# Patient Record
Sex: FEMALE | Race: WHITE | NOT HISPANIC OR LATINO | URBAN - METROPOLITAN AREA
[De-identification: names, ages, dates, MRNs, and addresses within clinical notes are randomized per-mention and may not be internally consistent; named-entity substitution may affect disease eponyms.]

---

## 2017-11-28 ENCOUNTER — IMPORTED ENCOUNTER (OUTPATIENT)
Dept: URBAN - METROPOLITAN AREA CLINIC 31 | Facility: CLINIC | Age: 72
End: 2017-11-28

## 2017-11-28 PROBLEM — H35.341: Noted: 2017-11-28

## 2017-11-28 PROBLEM — Z96.1: Noted: 2017-11-28

## 2017-11-28 PROBLEM — H43.811: Noted: 2017-11-28

## 2017-11-28 PROCEDURE — 92015 DETERMINE REFRACTIVE STATE: CPT

## 2017-11-28 PROCEDURE — 92014 COMPRE OPH EXAM EST PT 1/>: CPT

## 2017-11-28 NOTE — PATIENT DISCUSSION
1.  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 .   Patient was cautioned to call our office immediately if they experience a substantial change in their symptoms such as an increase in floaters persistent flashes loss of visual field 2. Pseudophakia OU - IOLs stable. Monitor. OD capsule open OS clear. 3.  No Rx change. 4.  Macular Hole OD -stable. sx 2/13 Patel5. RTN  CE Oct 2018. Sees DR Jackie Rosenthal in January yearly.

## 2019-01-03 ENCOUNTER — IMPORTED ENCOUNTER (OUTPATIENT)
Dept: URBAN - METROPOLITAN AREA CLINIC 31 | Facility: CLINIC | Age: 74
End: 2019-01-03

## 2019-01-03 PROBLEM — H43.811: Noted: 2019-01-03

## 2019-01-03 PROBLEM — H35.341: Noted: 2019-01-03

## 2019-01-03 PROBLEM — Z96.1: Noted: 2019-01-03

## 2019-01-03 PROCEDURE — 92015 DETERMINE REFRACTIVE STATE: CPT

## 2019-01-03 PROCEDURE — 92014 COMPRE OPH EXAM EST PT 1/>: CPT

## 2019-01-03 NOTE — PATIENT DISCUSSION
1.  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 .   Patient was cautioned to call our office immediately if they experience a substantial change in their symptoms such as an increase in floaters persistent flashes loss of visual field 2. Pseudophakia OU - IOLs stable. Monitor. OD capsule open OS clear. 3.  No Rx change. 4.  Macular Hole OD -stable. sx 2/13 Patel5. RTN  CE 1/19. Sees DR Yaa Galo in January yearly.

## 2019-05-02 ENCOUNTER — IMPORTED ENCOUNTER (OUTPATIENT)
Dept: URBAN - METROPOLITAN AREA CLINIC 31 | Facility: CLINIC | Age: 74
End: 2019-05-02

## 2019-05-02 PROBLEM — H11.31: Noted: 2019-05-02

## 2019-05-02 PROCEDURE — 99213 OFFICE O/P EST LOW 20 MIN: CPT

## 2020-01-28 ENCOUNTER — IMPORTED ENCOUNTER (OUTPATIENT)
Dept: URBAN - METROPOLITAN AREA CLINIC 31 | Facility: CLINIC | Age: 75
End: 2020-01-28

## 2020-01-28 PROBLEM — Z96.1: Noted: 2020-01-28

## 2020-01-28 PROBLEM — H35.341: Noted: 2020-01-28

## 2020-01-28 PROBLEM — H43.811: Noted: 2020-01-28

## 2020-01-28 PROBLEM — H11.31: Noted: 2020-01-28

## 2020-01-28 PROCEDURE — 92015 DETERMINE REFRACTIVE STATE: CPT

## 2020-01-28 PROCEDURE — 92014 COMPRE OPH EXAM EST PT 1/>: CPT

## 2020-01-28 NOTE — PATIENT DISCUSSION
1.  Subconjunctival hemorrhage OD --  Disc with pt and reassured. @x per pt in past yr. The condition was discussed with patient and the patient was reassured. The patient was asked to notify his family physician should he have increased bleeding or bruising elsewhere or recurrent subconjunctival hemorrhages. 2. 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 .   Patient was cautioned to call our office immediately if they experience a substantial change in their symptoms such as an increase in floaters persistent flashes loss of visual field 3. Pseudophakia OU - IOLs stable. Monitor. OD capsule open OS clear. 4.  No Rx change. 5.  Macular Hole OD -stable. sx 2/13 Patel6. RTN  CE 1/21  Sees DR Aarti Arias in January yearly.

## 2020-01-28 NOTE — PATIENT DISCUSSION
1.  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 .   Patient was cautioned to call our office immediately if they experience a substantial change in their symptoms such as an increase in floaters persistent flashes loss of visual field 2. Pseudophakia OU - IOLs stable. Monitor. OD capsule open OS clear. 3.  No Rx change. 4.  Macular Hole OD -stable. sx 2/13 Patel5. RTN  CE 1/19. Sees DR Ronal Isidro in January yearly.

## 2021-01-28 ENCOUNTER — IMPORTED ENCOUNTER (OUTPATIENT)
Dept: URBAN - METROPOLITAN AREA CLINIC 31 | Facility: CLINIC | Age: 76
End: 2021-01-28

## 2021-01-28 PROBLEM — Z96.1: Noted: 2021-01-28

## 2021-01-28 PROBLEM — H35.341: Noted: 2021-01-28

## 2021-01-28 PROBLEM — H11.31: Noted: 2021-01-28

## 2021-01-28 PROBLEM — H43.811: Noted: 2021-01-28

## 2021-01-28 PROCEDURE — 92250 FUNDUS PHOTOGRAPHY W/I&R: CPT

## 2021-01-28 PROCEDURE — 92015 DETERMINE REFRACTIVE STATE: CPT

## 2021-01-28 PROCEDURE — 92014 COMPRE OPH EXAM EST PT 1/>: CPT

## 2021-01-28 NOTE — PATIENT DISCUSSION
1.  Subconjunctival hemorrhage OD --  Disc with pt and reassured. @x per pt in past yr. The condition was discussed with patient and the patient was reassured. The patient was asked to notify his family physician should he have increased bleeding or bruising elsewhere or recurrent subconjunctival hemorrhages. 2. 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 .   Patient was cautioned to call our office immediately if they experience a substantial change in their symptoms such as an increase in floaters persistent flashes loss of visual field 3. Pseudophakia OU - IOLs stable. Monitor. OD capsule open OS clear. 4.  No Rx change. 5.  Macular Hole OD -stable. sx 2/13 Patel6. RTN  CE 1/22 Sees DR Jacquelyn Larkin in January yearly.

## 2022-01-27 ENCOUNTER — IMPORTED ENCOUNTER (OUTPATIENT)
Dept: URBAN - METROPOLITAN AREA CLINIC 31 | Facility: CLINIC | Age: 77
End: 2022-01-27

## 2022-01-27 PROBLEM — H43.811: Noted: 2022-01-27

## 2022-01-27 PROBLEM — H35.341: Noted: 2022-01-27

## 2022-01-27 PROBLEM — H11.31: Noted: 2022-01-27

## 2022-01-27 PROBLEM — Z96.1: Noted: 2022-01-27

## 2022-01-27 PROCEDURE — 92015 DETERMINE REFRACTIVE STATE: CPT

## 2022-01-27 PROCEDURE — 99214 OFFICE O/P EST MOD 30 MIN: CPT

## 2022-01-27 NOTE — PATIENT DISCUSSION
1.  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 .   Patient was cautioned to call our office immediately if they experience a substantial change in their symptoms such as an increase in floaters persistent flashes loss of visual field 3. Pseudophakia OU - IOLs stable. Monitor. OD capsule open OS clear. 4.  No Rx change. --Gave copy5. Macular Hole OD -stable. sx 2/13 Patel6. Large ONH's--  eval in future7. RTN  CE/[employer]-- Nerve 1/23 ----Sees DR Minda Krabbe in October yearly.

## 2022-04-02 ASSESSMENT — VISUAL ACUITY
OD_SC: 20/30
OS_SC: 20/20

## 2022-04-02 ASSESSMENT — TONOMETRY
OS_IOP_MMHG: 18
OD_IOP_MMHG: 18
OD_IOP_MMHG: 18
OS_IOP_MMHG: 17
OD_IOP_MMHG: 16
OS_IOP_MMHG: 17
OS_IOP_MMHG: 18
OD_IOP_MMHG: 16
OS_IOP_MMHG: 17
OD_IOP_MMHG: 16
OS_IOP_MMHG: 16
OD_IOP_MMHG: 18

## 2022-04-02 ASSESSMENT — PACHYMETRY
OD_CT_UM: 593
OD_CT_UM: 569
OS_CT_UM: 555
OD_CT_UM: 594
OS_CT_UM: 581
OS_CT_UM: 591

## 2024-02-20 ENCOUNTER — COMPREHENSIVE EXAM (OUTPATIENT)
Dept: URBAN - METROPOLITAN AREA CLINIC 34 | Facility: CLINIC | Age: 79
End: 2024-02-20

## 2024-02-20 DIAGNOSIS — Z96.1: ICD-10-CM

## 2024-02-20 DIAGNOSIS — H40.013: ICD-10-CM

## 2024-02-20 DIAGNOSIS — H43.811: ICD-10-CM

## 2024-02-20 DIAGNOSIS — H35.341: ICD-10-CM

## 2024-02-20 PROCEDURE — 92133 CPTRZD OPH DX IMG PST SGM ON: CPT

## 2024-02-20 PROCEDURE — 92014 COMPRE OPH EXAM EST PT 1/>: CPT

## 2024-02-20 PROCEDURE — 92015 DETERMINE REFRACTIVE STATE: CPT

## 2024-02-20 ASSESSMENT — VISUAL ACUITY
OS_CC: J1
OD_CC: 20/25
OD_CC: J1
OS_CC: 20/20

## 2024-02-20 ASSESSMENT — TONOMETRY
OS_IOP_MMHG: 16
OD_IOP_MMHG: 16

## 2025-02-18 ENCOUNTER — COMPREHENSIVE EXAM (OUTPATIENT)
Age: 80
End: 2025-02-18

## 2025-02-18 DIAGNOSIS — H52.4: ICD-10-CM

## 2025-02-18 DIAGNOSIS — H43.811: ICD-10-CM

## 2025-02-18 DIAGNOSIS — H40.013: ICD-10-CM

## 2025-02-18 DIAGNOSIS — H35.341: ICD-10-CM

## 2025-02-18 DIAGNOSIS — Z96.1: ICD-10-CM

## 2025-02-18 PROCEDURE — 92133 CPTRZD OPH DX IMG PST SGM ON: CPT

## 2025-02-18 PROCEDURE — 92015 DETERMINE REFRACTIVE STATE: CPT

## 2025-02-18 PROCEDURE — 92014 COMPRE OPH EXAM EST PT 1/>: CPT
